# Patient Record
Sex: FEMALE
[De-identification: names, ages, dates, MRNs, and addresses within clinical notes are randomized per-mention and may not be internally consistent; named-entity substitution may affect disease eponyms.]

---

## 2022-02-25 ENCOUNTER — APPOINTMENT (OUTPATIENT)
Dept: PULMONOLOGY | Facility: CLINIC | Age: 57
End: 2022-02-25

## 2022-03-11 ENCOUNTER — APPOINTMENT (OUTPATIENT)
Dept: PULMONOLOGY | Facility: CLINIC | Age: 57
End: 2022-03-11
Payer: MEDICARE

## 2022-03-11 VITALS
TEMPERATURE: 96.8 F | BODY MASS INDEX: 40.02 KG/M2 | SYSTOLIC BLOOD PRESSURE: 110 MMHG | HEART RATE: 78 BPM | WEIGHT: 212 LBS | DIASTOLIC BLOOD PRESSURE: 80 MMHG | OXYGEN SATURATION: 97 % | HEIGHT: 61 IN

## 2022-03-11 DIAGNOSIS — Z86.79 PERSONAL HISTORY OF OTHER DISEASES OF THE CIRCULATORY SYSTEM: ICD-10-CM

## 2022-03-11 DIAGNOSIS — E78.5 HYPERLIPIDEMIA, UNSPECIFIED: ICD-10-CM

## 2022-03-11 DIAGNOSIS — R06.83 SNORING: ICD-10-CM

## 2022-03-11 PROCEDURE — 99205 OFFICE O/P NEW HI 60 MIN: CPT

## 2022-03-11 RX ORDER — FAMOTIDINE 20 MG/1
20 TABLET, FILM COATED ORAL
Refills: 0 | Status: ACTIVE | COMMUNITY

## 2022-03-11 RX ORDER — ATORVASTATIN CALCIUM 20 MG/1
20 TABLET, FILM COATED ORAL
Refills: 0 | Status: ACTIVE | COMMUNITY

## 2022-03-11 RX ORDER — AMLODIPINE BESYLATE 10 MG/1
10 TABLET ORAL
Refills: 0 | Status: ACTIVE | COMMUNITY

## 2022-03-11 RX ORDER — EZETIMIBE 10 MG/1
10 TABLET ORAL
Refills: 0 | Status: ACTIVE | COMMUNITY

## 2022-03-11 RX ORDER — LOSARTAN POTASSIUM 50 MG/1
50 TABLET, FILM COATED ORAL
Refills: 0 | Status: ACTIVE | COMMUNITY

## 2022-03-11 RX ORDER — MELOXICAM 15 MG/1
TABLET ORAL
Refills: 0 | Status: ACTIVE | COMMUNITY

## 2022-03-11 RX ORDER — OMEPRAZOLE 40 MG/1
40 CAPSULE, DELAYED RELEASE ORAL
Refills: 0 | Status: ACTIVE | COMMUNITY

## 2022-03-11 RX ORDER — GABAPENTIN 300 MG
300 TABLET ORAL
Refills: 0 | Status: ACTIVE | COMMUNITY

## 2022-03-11 RX ORDER — METOPROLOL SUCCINATE 25 MG/1
25 TABLET, EXTENDED RELEASE ORAL
Refills: 0 | Status: ACTIVE | COMMUNITY

## 2022-03-11 RX ORDER — CLOPIDOGREL BISULFATE 75 MG/1
75 TABLET, FILM COATED ORAL
Refills: 0 | Status: ACTIVE | COMMUNITY

## 2022-03-13 NOTE — HISTORY OF PRESENT ILLNESS
[Never] : never [Awakes Unrefreshed] : awakes unrefreshed [Awakes with Dry Mouth] : awakes with dry mouth [Fatigue] : fatigue [Difficulty Maintaining Sleep] : difficulty maintaining sleep [Snoring] : snoring [Witnessed Apneas] : witnessed apneas [TextBox_4] : She is a 56-year-old woman who presented with excessive snoring, poor quality sleep and daytime sleepiness. There is no prior history of sleep disorder breathing.

## 2022-03-13 NOTE — DISCUSSION/SUMMARY
[FreeTextEntry1] : She is a 56 year-old woman with a history of hypertension, hyperlipidemia, diabetes, GERD and an elevated BMI of (40) who presented with excessive snoring, poor quality sleep and daytime sleepiness. There is no prior history of sleep disorder breathing.\par \par The findings are suspicious for sleep disordered breathing, obstructive sleep apnea. A sleep study will be obtained. Further recommendations to follow.\par \par

## 2022-03-13 NOTE — PHYSICAL EXAM
[No Acute Distress] : no acute distress [IV] : Mallampati Class: IV [No Neck Mass] : no neck mass [Normal S1, S2] : normal s1, s2 [Clear to Auscultation Bilaterally] : clear to auscultation bilaterally [Normal Gait] : normal gait [No Cyanosis] : no cyanosis [Normal Turgor] : normal turgor [No Focal Deficits] : no focal deficits [Oriented x3] : oriented x3

## 2022-03-13 NOTE — REVIEW OF SYSTEMS
[Dry Mouth] : dry mouth [Fatigue] : fatigue [GERD] : gerd [Diabetes] : diabetes [Fever] : no fever [Dyspnea] : no dyspnea [Chest Discomfort] : no chest discomfort [Nasal Discharge] : no nasal discharge [History of Iron Deficiency] : no history of iron deficiency [Headache] : no headache [Anxiety] : no anxiety